# Patient Record
Sex: FEMALE | Race: WHITE | NOT HISPANIC OR LATINO | Employment: OTHER | URBAN - METROPOLITAN AREA
[De-identification: names, ages, dates, MRNs, and addresses within clinical notes are randomized per-mention and may not be internally consistent; named-entity substitution may affect disease eponyms.]

---

## 2020-08-03 ENCOUNTER — HOSPITAL ENCOUNTER (EMERGENCY)
Facility: HOSPITAL | Age: 65
Discharge: HOME | End: 2020-08-03
Attending: EMERGENCY MEDICINE
Payer: MEDICARE

## 2020-08-03 VITALS
HEIGHT: 63 IN | BODY MASS INDEX: 33.49 KG/M2 | DIASTOLIC BLOOD PRESSURE: 96 MMHG | WEIGHT: 189 LBS | OXYGEN SATURATION: 100 % | SYSTOLIC BLOOD PRESSURE: 171 MMHG | RESPIRATION RATE: 18 BRPM | HEART RATE: 113 BPM | TEMPERATURE: 99 F

## 2020-08-03 DIAGNOSIS — S52.571A OTHER CLOSED INTRA-ARTICULAR FRACTURE OF DISTAL END OF RIGHT RADIUS, INITIAL ENCOUNTER: Primary | ICD-10-CM
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
## 2020-08-03 PROCEDURE — 25000003 PHARM REV CODE 250: Performed by: EMERGENCY MEDICINE

## 2020-08-03 PROCEDURE — 99283 EMERGENCY DEPT VISIT LOW MDM: CPT | Mod: 25

## 2020-08-03 PROCEDURE — 29125 APPL SHORT ARM SPLINT STATIC: CPT | Mod: RT

## 2020-08-03 RX ORDER — OXYCODONE AND ACETAMINOPHEN 5; 325 MG/1; MG/1
1 TABLET ORAL
Status: COMPLETED | OUTPATIENT
Start: 2020-08-03 | End: 2020-08-03

## 2020-08-03 RX ORDER — HYDROCODONE BITARTRATE AND ACETAMINOPHEN 5; 325 MG/1; MG/1
1 TABLET ORAL EVERY 4 HOURS PRN
Qty: 18 TABLET | Refills: 0 | Status: SHIPPED | OUTPATIENT
Start: 2020-08-03

## 2020-08-03 RX ADMIN — OXYCODONE AND ACETAMINOPHEN 1 TABLET: 5; 325 TABLET ORAL at 01:08

## 2020-08-03 NOTE — ED PROVIDER NOTES
Encounter Date: 8/3/2020       History     Chief Complaint   Patient presents with    Wrist Pain     States fell on Saturday injuring her R wrist      65-year-old female has a benign past medical history, presents emergency room with complaints of having pain to her right hand and wrist along with swelling since she had fallen 2 days ago.  Patient is right handed.  She denies any sensory changes to her fingers.  She has no elbow pain.  No humeral or shoulder pain on the right.  The patient sustained a FOOSH injury.  No other complaints otherwise voiced.        Review of patient's allergies indicates:  No Known Allergies  No past medical history on file.  No past surgical history on file.  No family history on file.  Social History     Tobacco Use    Smoking status: Not on file   Substance Use Topics    Alcohol use: Not on file    Drug use: Not on file     Review of Systems   Musculoskeletal: Positive for arthralgias and joint swelling.   Skin: Negative for rash.   Neurological: Negative for weakness and numbness.   Hematological: Does not bruise/bleed easily.   All other systems reviewed and are negative.      Physical Exam     Initial Vitals [08/03/20 1144]   BP Pulse Resp Temp SpO2   (!) 171/96 (!) 113 20 98.9 °F (37.2 °C) 100 %      MAP       --         Physical Exam    Musculoskeletal:      Right wrist: She exhibits decreased range of motion, tenderness, bony tenderness, swelling and deformity. She exhibits no crepitus and no laceration.      Comments: Right upper extremity has evidence of significant swelling to the wrist and hand area.  There is limited range of motion of the fingers secondary to pain and swelling.  The neurovascular status is intact.  Patient does have pain to palpation over the distal radius but no mid or proximal forearm tenderness.  There is no elbow tenderness.  There is no tenderness to palpation of the fingers.  Range of motion at the wrist is significantly limited secondary to pain  and swelling.         ED Course   Procedures  Labs Reviewed - No data to display       Imaging Results          X-Ray Hand 3 View Right (In process)                X-Ray Wrist Complete Right (Final result)  Result time 08/03/20 12:21:22    Final result by Bronwyn Rodriguez MD (08/03/20 12:21:22)                 Narrative:    4 views of the right wrist    Clinical history is pain    There is a transverse impacted fracture of the distal radial  metaphysis. There is no angulation or displacement.    There is an 8 mm subchondral cyst or geode in the distal ulna.    There are degenerative changes of the first carpal metacarpal joint.  There are no additional fractures.    IMPRESSION: Transverse impacted fracture of the distal radial  metaphysis without angulation    8 mm subchondral cysts within the distal ulnar    Degenerative changes of the first carpal metacarpal joint    Electronically Signed by Bronwyn Rodriguez M.D. on 8/3/2020 12:29 PM                                            Attending Attestation:             Attending ED Notes:   X-ray of the right hand and wrist reveals an impacted not angulated distal radial fracture.  There appears to be an intra-articular component.  Patient will be placed in a sugar-tong splint is given 5 mg Percocet in the ED. She will be discharged to follow up with Orthopedic surgery.  The patient will be continued on analgesics and advised to keep the extremity splinted and will be continued on hydrocodone for pain as an outpatient.  I assisted the ED tach with splint application.  The neurovascular status is intact after splint applied.                        Clinical Impression:       ICD-10-CM ICD-9-CM   1. Other closed intra-articular fracture of distal end of right radius, initial encounter  S52.571A 813.42   2. Fall  W19.XXXA E888.9                                Hakeem Ruelas Jr., MD  08/03/20 1308       Hakeem Ruelas Jr., MD  08/03/20 1317

## 2020-08-03 NOTE — DISCHARGE INSTRUCTIONS
Ice pack to the wrist for 24-48 hours.  Take Norco every 4 hr if needed for pain.  Neurovascular checks the fingers.  Keep the hand elevated to reduce swelling.  Call Dr. Moyer is office for an appointment.